# Patient Record
Sex: FEMALE | Race: WHITE | Employment: FULL TIME | URBAN - METROPOLITAN AREA
[De-identification: names, ages, dates, MRNs, and addresses within clinical notes are randomized per-mention and may not be internally consistent; named-entity substitution may affect disease eponyms.]

---

## 2019-09-26 ENCOUNTER — APPOINTMENT (OUTPATIENT)
Dept: GENERAL RADIOLOGY | Age: 19
End: 2019-09-26
Payer: COMMERCIAL

## 2019-09-26 ENCOUNTER — APPOINTMENT (OUTPATIENT)
Dept: CT IMAGING | Age: 19
End: 2019-09-26
Payer: COMMERCIAL

## 2019-09-26 ENCOUNTER — HOSPITAL ENCOUNTER (EMERGENCY)
Age: 19
Discharge: HOME | End: 2019-09-27
Attending: EMERGENCY MEDICINE
Payer: COMMERCIAL

## 2019-09-26 DIAGNOSIS — I95.1 ORTHOSTATIC HYPOTENSION: ICD-10-CM

## 2019-09-26 DIAGNOSIS — R42 DIZZINESS: Primary | ICD-10-CM

## 2019-09-26 LAB
A/G RATIO: 1.4 (ref 1.1–2.2)
ALBUMIN SERPL-MCNC: 4.2 G/DL (ref 3.4–5)
ALP BLD-CCNC: 64 U/L (ref 40–129)
ALT SERPL-CCNC: 9 U/L (ref 10–40)
AMPHETAMINE SCREEN, URINE: NORMAL
ANION GAP SERPL CALCULATED.3IONS-SCNC: 11 MMOL/L (ref 3–16)
AST SERPL-CCNC: 11 U/L (ref 15–37)
BACTERIA: ABNORMAL /HPF
BARBITURATE SCREEN URINE: NORMAL
BASOPHILS ABSOLUTE: 0 K/UL (ref 0–0.2)
BASOPHILS RELATIVE PERCENT: 0.4 %
BENZODIAZEPINE SCREEN, URINE: NORMAL
BILIRUB SERPL-MCNC: <0.2 MG/DL (ref 0–1)
BILIRUBIN URINE: NEGATIVE
BLOOD, URINE: ABNORMAL
BUN BLDV-MCNC: 12 MG/DL (ref 7–20)
CALCIUM SERPL-MCNC: 9.4 MG/DL (ref 8.3–10.6)
CANNABINOID SCREEN URINE: NORMAL
CHLORIDE BLD-SCNC: 101 MMOL/L (ref 99–110)
CLARITY: CLEAR
CO2: 23 MMOL/L (ref 21–32)
COCAINE METABOLITE SCREEN URINE: NORMAL
COLOR: YELLOW
CREAT SERPL-MCNC: 0.8 MG/DL (ref 0.6–1.1)
D DIMER: 249 NG/ML DDU (ref 0–229)
EOSINOPHILS ABSOLUTE: 0.1 K/UL (ref 0–0.6)
EOSINOPHILS RELATIVE PERCENT: 1.2 %
EPITHELIAL CELLS, UA: ABNORMAL /HPF
GFR AFRICAN AMERICAN: >60
GFR NON-AFRICAN AMERICAN: >60
GLOBULIN: 2.9 G/DL
GLUCOSE BLD-MCNC: 155 MG/DL (ref 70–99)
GLUCOSE URINE: NEGATIVE MG/DL
HCG QUALITATIVE: NEGATIVE
HCT VFR BLD CALC: 37.7 % (ref 36–48)
HEMOGLOBIN: 12.6 G/DL (ref 12–16)
KETONES, URINE: NEGATIVE MG/DL
LEUKOCYTE ESTERASE, URINE: NEGATIVE
LYMPHOCYTES ABSOLUTE: 2.5 K/UL (ref 1–5.1)
LYMPHOCYTES RELATIVE PERCENT: 34 %
Lab: NORMAL
MCH RBC QN AUTO: 27.9 PG (ref 26–34)
MCHC RBC AUTO-ENTMCNC: 33.5 G/DL (ref 31–36)
MCV RBC AUTO: 83.2 FL (ref 80–100)
METHADONE SCREEN, URINE: NORMAL
MICROSCOPIC EXAMINATION: YES
MONOCYTES ABSOLUTE: 0.4 K/UL (ref 0–1.3)
MONOCYTES RELATIVE PERCENT: 5.9 %
MUCUS: ABNORMAL /LPF
NEUTROPHILS ABSOLUTE: 4.3 K/UL (ref 1.7–7.7)
NEUTROPHILS RELATIVE PERCENT: 58.5 %
NITRITE, URINE: NEGATIVE
OPIATE SCREEN URINE: NORMAL
OXYCODONE URINE: NORMAL
PDW BLD-RTO: 15.1 % (ref 12.4–15.4)
PH UA: 6
PH UA: 6 (ref 5–8)
PHENCYCLIDINE SCREEN URINE: NORMAL
PLATELET # BLD: 321 K/UL (ref 135–450)
PMV BLD AUTO: 7.4 FL (ref 5–10.5)
POTASSIUM REFLEX MAGNESIUM: 3.6 MMOL/L (ref 3.5–5.1)
PROPOXYPHENE SCREEN: NORMAL
PROTEIN UA: NEGATIVE MG/DL
RBC # BLD: 4.53 M/UL (ref 4–5.2)
RBC UA: ABNORMAL /HPF (ref 0–2)
SODIUM BLD-SCNC: 135 MMOL/L (ref 136–145)
SPECIFIC GRAVITY UA: >=1.03 (ref 1–1.03)
TOTAL PROTEIN: 7.1 G/DL (ref 6.4–8.2)
URINE REFLEX TO CULTURE: ABNORMAL
URINE TYPE: ABNORMAL
UROBILINOGEN, URINE: 0.2 E.U./DL
WBC # BLD: 7.4 K/UL (ref 4–11)
WBC UA: ABNORMAL /HPF (ref 0–5)

## 2019-09-26 PROCEDURE — 80307 DRUG TEST PRSMV CHEM ANLYZR: CPT

## 2019-09-26 PROCEDURE — 85379 FIBRIN DEGRADATION QUANT: CPT

## 2019-09-26 PROCEDURE — 6360000004 HC RX CONTRAST MEDICATION: Performed by: EMERGENCY MEDICINE

## 2019-09-26 PROCEDURE — 71046 X-RAY EXAM CHEST 2 VIEWS: CPT

## 2019-09-26 PROCEDURE — 80053 COMPREHEN METABOLIC PANEL: CPT

## 2019-09-26 PROCEDURE — 93005 ELECTROCARDIOGRAM TRACING: CPT | Performed by: NURSE PRACTITIONER

## 2019-09-26 PROCEDURE — 84703 CHORIONIC GONADOTROPIN ASSAY: CPT

## 2019-09-26 PROCEDURE — 99284 EMERGENCY DEPT VISIT MOD MDM: CPT

## 2019-09-26 PROCEDURE — 85025 COMPLETE CBC W/AUTO DIFF WBC: CPT

## 2019-09-26 PROCEDURE — 96361 HYDRATE IV INFUSION ADD-ON: CPT

## 2019-09-26 PROCEDURE — 81001 URINALYSIS AUTO W/SCOPE: CPT

## 2019-09-26 PROCEDURE — 71260 CT THORAX DX C+: CPT

## 2019-09-26 PROCEDURE — 6370000000 HC RX 637 (ALT 250 FOR IP): Performed by: NURSE PRACTITIONER

## 2019-09-26 PROCEDURE — 2580000003 HC RX 258: Performed by: NURSE PRACTITIONER

## 2019-09-26 RX ORDER — DIPHENHYDRAMINE HYDROCHLORIDE 50 MG/ML
12.5 INJECTION INTRAMUSCULAR; INTRAVENOUS ONCE
Status: COMPLETED | OUTPATIENT
Start: 2019-09-26 | End: 2019-09-27

## 2019-09-26 RX ORDER — 0.9 % SODIUM CHLORIDE 0.9 %
1000 INTRAVENOUS SOLUTION INTRAVENOUS ONCE
Status: COMPLETED | OUTPATIENT
Start: 2019-09-26 | End: 2019-09-27

## 2019-09-26 RX ORDER — MECLIZINE HYDROCHLORIDE CHEWABLE TABLETS 25 MG/1
25 TABLET, CHEWABLE ORAL ONCE
Status: COMPLETED | OUTPATIENT
Start: 2019-09-26 | End: 2019-09-26

## 2019-09-26 RX ORDER — 0.9 % SODIUM CHLORIDE 0.9 %
2000 INTRAVENOUS SOLUTION INTRAVENOUS ONCE
Status: COMPLETED | OUTPATIENT
Start: 2019-09-26 | End: 2019-09-27

## 2019-09-26 RX ORDER — ONDANSETRON 4 MG/1
4 TABLET, FILM COATED ORAL EVERY 8 HOURS PRN
Qty: 20 TABLET | Refills: 0 | Status: SHIPPED | OUTPATIENT
Start: 2019-09-26

## 2019-09-26 RX ORDER — MECLIZINE HYDROCHLORIDE 25 MG/1
25 TABLET ORAL 3 TIMES DAILY PRN
Qty: 30 TABLET | Refills: 0 | Status: SHIPPED | OUTPATIENT
Start: 2019-09-26 | End: 2019-10-06

## 2019-09-26 RX ORDER — METOCLOPRAMIDE HYDROCHLORIDE 5 MG/ML
10 INJECTION INTRAMUSCULAR; INTRAVENOUS ONCE
Status: COMPLETED | OUTPATIENT
Start: 2019-09-26 | End: 2019-09-27

## 2019-09-26 RX ORDER — NORETHINDRONE ACETATE AND ETHINYL ESTRADIOL 1MG-20(21)
1 KIT ORAL DAILY
COMMUNITY
Start: 2019-08-07

## 2019-09-26 RX ORDER — KETOROLAC TROMETHAMINE 30 MG/ML
30 INJECTION, SOLUTION INTRAMUSCULAR; INTRAVENOUS ONCE
Status: COMPLETED | OUTPATIENT
Start: 2019-09-26 | End: 2019-09-27

## 2019-09-26 RX ADMIN — MECLIZINE HCL 25 MG: 25 TABLET, CHEWABLE ORAL at 21:00

## 2019-09-26 RX ADMIN — SODIUM CHLORIDE 2000 ML: 9 INJECTION, SOLUTION INTRAVENOUS at 20:59

## 2019-09-26 RX ADMIN — IOPAMIDOL 85 ML: 755 INJECTION, SOLUTION INTRAVENOUS at 22:07

## 2019-09-26 ASSESSMENT — PAIN DESCRIPTION - DESCRIPTORS: DESCRIPTORS: ACHING

## 2019-09-26 ASSESSMENT — PAIN DESCRIPTION - PAIN TYPE: TYPE: ACUTE PAIN

## 2019-09-26 ASSESSMENT — PAIN DESCRIPTION - LOCATION: LOCATION: HEAD

## 2019-09-26 ASSESSMENT — PAIN SCALES - GENERAL: PAINLEVEL_OUTOF10: 3

## 2019-09-27 VITALS
OXYGEN SATURATION: 99 % | WEIGHT: 200 LBS | HEIGHT: 63 IN | DIASTOLIC BLOOD PRESSURE: 79 MMHG | BODY MASS INDEX: 35.44 KG/M2 | TEMPERATURE: 97.9 F | SYSTOLIC BLOOD PRESSURE: 120 MMHG | RESPIRATION RATE: 19 BRPM | HEART RATE: 81 BPM

## 2019-09-27 LAB
EKG ATRIAL RATE: 104 BPM
EKG DIAGNOSIS: NORMAL
EKG P AXIS: 45 DEGREES
EKG P-R INTERVAL: 152 MS
EKG Q-T INTERVAL: 326 MS
EKG QRS DURATION: 86 MS
EKG QTC CALCULATION (BAZETT): 428 MS
EKG R AXIS: 16 DEGREES
EKG T AXIS: 11 DEGREES
EKG VENTRICULAR RATE: 104 BPM

## 2019-09-27 PROCEDURE — 96361 HYDRATE IV INFUSION ADD-ON: CPT

## 2019-09-27 PROCEDURE — 93010 ELECTROCARDIOGRAM REPORT: CPT | Performed by: INTERNAL MEDICINE

## 2019-09-27 PROCEDURE — 6360000002 HC RX W HCPCS: Performed by: NURSE PRACTITIONER

## 2019-09-27 PROCEDURE — 2580000003 HC RX 258: Performed by: NURSE PRACTITIONER

## 2019-09-27 PROCEDURE — 96375 TX/PRO/DX INJ NEW DRUG ADDON: CPT

## 2019-09-27 PROCEDURE — 96374 THER/PROPH/DIAG INJ IV PUSH: CPT

## 2019-09-27 RX ADMIN — KETOROLAC TROMETHAMINE 30 MG: 30 INJECTION, SOLUTION INTRAMUSCULAR at 00:18

## 2019-09-27 RX ADMIN — SODIUM CHLORIDE 1000 ML: 9 INJECTION, SOLUTION INTRAVENOUS at 00:18

## 2019-09-27 RX ADMIN — DIPHENHYDRAMINE HYDROCHLORIDE 12.5 MG: 50 INJECTION, SOLUTION INTRAMUSCULAR; INTRAVENOUS at 00:18

## 2019-09-27 RX ADMIN — METOCLOPRAMIDE 10 MG: 5 INJECTION, SOLUTION INTRAMUSCULAR; INTRAVENOUS at 00:18

## 2019-09-27 ASSESSMENT — ENCOUNTER SYMPTOMS
ABDOMINAL PAIN: 0
SHORTNESS OF BREATH: 0

## 2019-09-27 ASSESSMENT — PAIN SCALES - GENERAL: PAINLEVEL_OUTOF10: 4

## 2019-09-27 NOTE — ED PROVIDER NOTES
visit, please see the advanced practice provider's documentation. Comment: Please note this report has been produced using speech recognition software and may contain errors related to that system including errors in grammar, punctuation, and spelling, as well as words and phrases that may be inappropriate. If there are any questions or concerns please feel free to contact the dictating provider for clarification.          [facility]ina [facility] [place]  09/27/19 1110

## 2020-08-29 ENCOUNTER — HOSPITAL ENCOUNTER (EMERGENCY)
Age: 20
Discharge: HOME | End: 2020-08-30
Attending: STUDENT IN AN ORGANIZED HEALTH CARE EDUCATION/TRAINING PROGRAM
Payer: COMMERCIAL

## 2020-08-29 PROCEDURE — 96374 THER/PROPH/DIAG INJ IV PUSH: CPT

## 2020-08-29 PROCEDURE — 2500000003 HC RX 250 WO HCPCS: Performed by: PHYSICIAN ASSISTANT

## 2020-08-29 PROCEDURE — 96372 THER/PROPH/DIAG INJ SC/IM: CPT

## 2020-08-29 PROCEDURE — 99283 EMERGENCY DEPT VISIT LOW MDM: CPT

## 2020-08-29 PROCEDURE — 96375 TX/PRO/DX INJ NEW DRUG ADDON: CPT

## 2020-08-29 PROCEDURE — 6360000002 HC RX W HCPCS: Performed by: PHYSICIAN ASSISTANT

## 2020-08-29 RX ORDER — DIPHENHYDRAMINE HYDROCHLORIDE 50 MG/ML
50 INJECTION INTRAMUSCULAR; INTRAVENOUS ONCE
Status: COMPLETED | OUTPATIENT
Start: 2020-08-29 | End: 2020-08-29

## 2020-08-29 RX ORDER — METHYLPREDNISOLONE SODIUM SUCCINATE 125 MG/2ML
125 INJECTION, POWDER, LYOPHILIZED, FOR SOLUTION INTRAMUSCULAR; INTRAVENOUS ONCE
Status: COMPLETED | OUTPATIENT
Start: 2020-08-29 | End: 2020-08-29

## 2020-08-29 RX ADMIN — METHYLPREDNISOLONE SODIUM SUCCINATE 125 MG: 125 INJECTION, POWDER, FOR SOLUTION INTRAMUSCULAR; INTRAVENOUS at 20:53

## 2020-08-29 RX ADMIN — DIPHENHYDRAMINE HYDROCHLORIDE 50 MG: 50 INJECTION, SOLUTION INTRAMUSCULAR; INTRAVENOUS at 20:53

## 2020-08-29 RX ADMIN — FAMOTIDINE 40 MG: 10 INJECTION INTRAVENOUS at 20:53

## 2020-08-29 RX ADMIN — EPINEPHRINE 0.3 MG: 1 INJECTION INTRAMUSCULAR; INTRAVENOUS; SUBCUTANEOUS at 21:24

## 2020-08-30 VITALS
RESPIRATION RATE: 14 BRPM | SYSTOLIC BLOOD PRESSURE: 121 MMHG | HEART RATE: 86 BPM | DIASTOLIC BLOOD PRESSURE: 92 MMHG | TEMPERATURE: 98.4 F | OXYGEN SATURATION: 97 %

## 2020-08-30 RX ORDER — EPINEPHRINE 0.3 MG/.3ML
0.3 INJECTION SUBCUTANEOUS ONCE
Qty: 2 EACH | Refills: 0 | Status: SHIPPED | OUTPATIENT
Start: 2020-08-30 | End: 2020-08-30

## 2020-08-30 NOTE — ED PROVIDER NOTES
I independently examined and evaluated Mariah Score. In brief, patient has a history of severe bee allergy for which she has an EpiPen. At approximately 8 PM patient ate shellfish and approximate 20 minutes later began to have tingling/numbness in her lips. She denies swelling of her lips but does feel that her tongue is swollen. She does report some difficulty swallowing and sore throat. She does appear to be tolerating secretions. She reports nausea but no vomiting or diarrhea. She does report some mild shortness of breath. She denies any rash or itching. She did not have her EpiPen with her, but reports this feels like other episodes but she has required an EpiPen. Focused exam revealed tachycardia, regular rhythm. Lungs clear to auscultation bilaterally-no evidence of wheezing or stridor. Abdomen is soft and nontender. I do not appreciate any urticaria. Patient's lips are stable from baseline based off of picture that patient shows me. I do not appreciate any significant swelling of the tongue, but patient does report that she feels her tongue is swollen. No swelling of the uvula. Mallampati score of 3. ED course:     Patient in no acute distress. Patient has a history of anaphylaxis to bee stings, she was exposed to shellfish today and feels that she is having an anaphylactic reaction. Patient is reporting tongue swelling and soreness of her throat. She does report some mild difficulty breathing and nausea. I do not appreciate any hives. SHe is tolerating secretions. Findings questionably concerning for some swelling at the back of the oropharynx. She is tachycardic. Patient initially given Solu-Medrol, Pepcid, and Benadryl. After my evaluation, patient states that she feels her symptoms are still worsening after this initial medication the patient was given epinephrine. Patient was also given a liter of fluids for tachycardia.   Discussed with patient when she received the

## 2020-08-30 NOTE — ED PROVIDER NOTES
[address] ED  EMERGENCY DEPARTMENT ENCOUNTER        Pt Name: Lynnell Bosworth  MRN: [id number]  [place] 2000  Date of evaluation: 8/29/2020  Provider: Flaquita Alamo PA-C  PCP: Marifer Katz DO  ED Attending: Kavya Gonzalez MD      This patient was seen and evaluated by the attending physician Kavya Gonzalez MD.    I have not independently evaluated this patient. CHIEF COMPLAINT       Chief Complaint   Patient presents with    Allergic Reaction     patient states she tried shell fish for the first time tonight and now c/o tongue swelling and difficulty swallowing. HISTORY OF PRESENT ILLNESS   (Location/Symptom, Timing/Onset, Context/Setting, Quality, Duration, Modifying Factors, Severity)  Note limiting factors. Lynnell Bosworth is a 22 y.o. female for evaluation via private vehicle accompanied by her father at bedside they advised 30 minutes prior to arrival she ate shellfish for the first time a lobster at [employer]. And patient began to have symptoms consistent with when she has a peanut allergy. Patient is concerned that she is having an allergic reaction. She advised she feeling like it is hard to breathe and her lips are swelling and tingling. She feels like her airway is constricted. Patient advised she has an EpiPen however it is at home. Patient denies any history of similar episodes. Nursing Notes were all reviewed and agreed with or any disagreements were addressed  in the HPI. REVIEW OF SYSTEMS  (2-9 systems for level 4, 10 or more for level 5)     Review of Systems   Constitutional: Negative for chills and fever. HENT: Positive for sore throat. Negative for congestion, rhinorrhea, sinus pressure and sinus pain. Eyes: Negative for discharge, redness and visual disturbance. Respiratory: Positive for shortness of breath and wheezing. Negative for cough and chest tightness. Cardiovascular: Negative for chest pain and palpitations. Gastrointestinal: Negative for abdominal pain, constipation, diarrhea, nausea and vomiting. Genitourinary: Negative for difficulty urinating, dysuria and frequency. Musculoskeletal: Negative. Skin: Negative. Neurological: Negative. Negative for dizziness, weakness, numbness and headaches. Psychiatric/Behavioral: Negative. All other systems reviewed and are negative. Positivesand Pertinent negatives as per HPI. Except as noted above in the ROS, all other systems were reviewed and negative.        PAST MEDICAL HISTORY     Past Medical History:   Diagnosis Date    Anxiety     Tension headache          SURGICAL HISTORY       Past Surgical History:   Procedure Laterality Date    APPENDECTOMY      TONSILLECTOMY      WISDOM TOOTH EXTRACTION  12-         CURRENT MEDICATIONS       Previous Medications    BLISOVI FE 1/20 1-20 MG-MCG PER TABLET    Take 1 tablet by mouth daily    EPINEPHRINE (EPIPEN 2-EARL) 0.3 MG/0.3ML SOAJ INJECTION    Inject 0.3 mLs into the muscle once for 1 dose Use as directed for allergic reaction    ONDANSETRON (ZOFRAN) 4 MG TABLET    Take 1 tablet by mouth every 8 hours as needed for Nausea         ALLERGIES     Bee venom and Dilaudid [hydromorphone]    FAMILY HISTORY       Family History   Problem Relation Age of Onset    Diabetes Mother     High Blood Pressure Mother     Diabetes Father     High Blood Pressure Father     High Cholesterol Father          SOCIAL HISTORY       Social History     Socioeconomic History    Marital status: Single     Spouse name: None    Number of children: None    Years of education: None    Highest education level: None   Occupational History    None   Social Needs    Financial resource strain: None    Food insecurity     Worry: None     Inability: None    Transportation needs     Medical: None     Non-medical: None   Tobacco Use    Smoking status: Never Smoker    Smokeless tobacco: Never Used   Substance and Sexual Activity    Alcohol use: No    Drug use: No    Sexual activity: None   Lifestyle    Physical activity     Days per week: None     Minutes per session: None    Stress: None   Relationships    Social connections     Talks on phone: None     Gets together: None     Attends Hoahaoism service: None     Active member of club or organization: None     Attends meetings of clubs or organizations: None     Relationship status: None    Intimate partner violence     Fear of current or ex partner: None     Emotionally abused: None     Physically abused: None     Forced sexual activity: None   Other Topics Concern    None   Social History Narrative    None       SCREENINGS     NIH Score       [facility]     Heart Score         PHYSICAL EXAM    (up to 7 for level 4, 8 ormore for level 5)     ED Triage Vitals [08/29/20 2025]   BP Temp Temp Source Pulse Resp SpO2 Height Weight   (!) 127/93 98.4 °F (36.9 °C) Oral 101 18 100 % -- --       Physical Exam  Vitals signs and nursing note reviewed. Constitutional:       Appearance: She is well-developed. She is not diaphoretic. HENT:      Head: Normocephalic. Nose: Nose normal.      Mouth/Throat:      Pharynx: Oropharynx is clear. Uvula midline. No pharyngeal swelling, oropharyngeal exudate, posterior oropharyngeal erythema or uvula swelling. Tonsils: No tonsillar exudate or tonsillar abscesses. Comments: Patient reports that the back of her throat is very scratchy. And she is concerned that she is having difficulty with breathing. Patient's airway is patent no evidence of any occlusion or swelling. No evidence of lip swelling. Eyes:      General:         Right eye: No discharge. Left eye: No discharge. Conjunctiva/sclera: Conjunctivae normal.      Pupils: Pupils are equal, round, and reactive to light. Neck:      Musculoskeletal: Normal range of motion. Cardiovascular:      Rate and Rhythm: Normal rate and regular rhythm. Heart sounds: Normal heart sounds. No murmur. No friction rub. No gallop. Pulmonary:      Effort: Pulmonary effort is normal. No respiratory distress. Breath sounds: Normal breath sounds. No wheezing. Comments: No wheezes no crackles or rails. Good air movement throughout  Abdominal:      General: Bowel sounds are normal. There is no distension. Palpations: Abdomen is soft. Tenderness: There is no abdominal tenderness. Musculoskeletal: Normal range of motion. Skin:     General: Skin is warm and dry. Capillary Refill: Capillary refill takes less than 2 seconds. Neurological:      General: No focal deficit present. Mental Status: She is alert and oriented to person, place, and time. Psychiatric:         Behavior: Behavior normal.         DIAGNOSTIC RESULTS   LABS:    Labs Reviewed - No data to display    All other labs were within normal range or notreturned as of this dictation. EKG: All EKG's are interpreted by the Emergency Department Physician who either signs or Co-signs this chart in the absence of a cardiologist.  Please see their note for interpretation of EKG. EMERGENCY DEPARTMENT COURSE and DIFFERENTIAL DIAGNOSIS/MDM:   Vitals:    Vitals:    08/29/20 2025   BP: (!) 127/93   Pulse: 101   Resp: 18   Temp: 98.4 °F (36.9 °C)   TempSrc: Oral   SpO2: 100%       Patient was given the following medications:  Medications   methylPREDNISolone sodium (SOLU-MEDROL) injection 125 mg (has no administration in time range)   famotidine (PEPCID) injection 40 mg (has no administration in time range)   diphenhydrAMINE (BENADRYL) injection 50 mg (has no administration in time range)         Afebrile, stable, patient presents to the ED for evaluation. Seen in conjunction with attending ED provider who agrees with assessment and plan. Nontoxic patient in mild distress on initial presentation very patient is very nervous on arrival her heart rate was mildly elevated.   She is concerned that she is having an allergic reaction. Patient has no evidence of anaphylaxis or allergic response on my assessment no rash no airway swelling. Patient advised that she is having scratchiness to her throat and lip swelling. Patient's SPO2 on room air is 97%. She is provided with Solu-Medrol Pepcid and Benadryl. On assessment by attending provider epinephrine was provided we then observe the patient for the next 4 hours. Numerous re-evaluations patient is comfortable with discharge at this time. Total critical care time today provided was 55 minutes. This excludes seperately billable procedures and family discussion time. Provided for concern for allergic reaction anaphylaxis, requiring immediate evaluation medication administration telemetric monitoring and numerous re-evaluations requiring intervention with concern for potential decompensation. All questions are answered. Indications for return to the ED are discussed. Patient is advised if any new or worsening symptoms arise they should immediately return to the emergency room. Follow-up with primary care in 1-2 days. The patient tolerated their visit well. They were seen and evaluated by the Alyse Cameron MD who agreed with the assessment and plan. The patient and / or the family were informed of the results of any tests, a time was given to answer questions, a plan was proposed and they agreed [place]. I estimate there is LOW risk for AIRWAY COMPROMISE, ANAPHYLAXIS, CELLULITIS, EPIGLOTTITIS, or NECROTIZING FASCIITIS, thus I consider the discharge disposition reasonable. Also, there is no evidence or peritonitis, sepsis, or toxicity. Mara Hernandez and I have discussed the diagnosis and risks, and we agree with discharging home to follow-up with their primary doctor. We also discussed returning to the Emergency Department immediately if new or worsening symptoms occur.  We have discussed the symptoms which are most concerning (e.g., bloody stool, fever, changing or worsening pain, vomiting) that necessitate immediate return. FINAL Impression  1. Allergic reaction, initial encounter        Blood pressure (!) 121/92, pulse 86, temperature 98.4 °F (36.9 °C), temperature source Oral, resp. rate 14, SpO2 97 %. PATIENT REFERRED TO:  No follow-up provider specified.     DISCHARGE MEDICATIONS:  New Prescriptions    No medications on file       DISCONTINUED MEDICATIONS:  Discontinued Medications    No medications on file              (Please note that portions of this note were completed with a voice recognition program.  Efforts were made to edit the dictations but occasionally words are mis-transcribed.)    Jojo Sterling PA-C (electronically signed)        Jojo Sterling PA-C  08/31/20 [id number]

## 2020-08-30 NOTE — ED PROVIDER NOTES
Horace of Care Note:    I assumed care of this patient at 23:00 from Dr. Selina Zurita, please see her note for more detail. Briefly, this pt is a 29-year-old female who presents with tongue swelling and difficulty swallowing after a presumed allergic reaction. The patient thinks that it may have been to eating lobster. The patient was treated in the emergency department with IM epinephrine, Benadryl, Solu-Medrol, and Pepcid with substantial improvement. Plan at time of signout is to observe the patient till 115 at which point it will have been 4 hours since her epinephrine shot. The patient continues to have substantial improvement plan is to discharge her home with an EpiPen and strict ER return precautions and outpatient follow-up. If patient has worsening allergic reaction she will likely be admitted for further observation. Patient is reevaluated and reports substantial improvement in her symptoms. She has no difficulty breathing or swallowing at this time. I feel she is appropriate for discharge with a prescription for a EpiPen, close outpatient follow-up for possible referral to allergist, and avoiding shellfish or other potential allergens. The patient expresses understanding and agreement with this plan and is discharged home. The patient is aware that there is a chance that there is a rebound allergic reaction and she is asked to remain very vigilant for any worsening of symptoms and if these occur give herself her EpiPen shot and come back to the emergency department for further evaluation      FINAL IMPRESSION      1.  Allergic reaction, initial encounter             Michael Pena MD  08/30/20 9613

## 2020-08-31 ASSESSMENT — ENCOUNTER SYMPTOMS
SINUS PAIN: 0
WHEEZING: 1
CONSTIPATION: 0
NAUSEA: 0
EYE REDNESS: 0
SORE THROAT: 1
DIARRHEA: 0
CHEST TIGHTNESS: 0
SHORTNESS OF BREATH: 1
SINUS PRESSURE: 0
EYE DISCHARGE: 0
VOMITING: 0
ABDOMINAL PAIN: 0
RHINORRHEA: 0
COUGH: 0

## 2022-10-12 ENCOUNTER — APPOINTMENT (OUTPATIENT)
Dept: GENERAL RADIOLOGY | Age: 22
End: 2022-10-12

## 2022-10-12 ENCOUNTER — HOSPITAL ENCOUNTER (EMERGENCY)
Age: 22
Discharge: HOME | End: 2022-10-12

## 2022-10-12 VITALS
WEIGHT: 210 LBS | BODY MASS INDEX: 37.21 KG/M2 | HEIGHT: 63 IN | DIASTOLIC BLOOD PRESSURE: 76 MMHG | RESPIRATION RATE: 30 BRPM | OXYGEN SATURATION: 98 % | TEMPERATURE: 99.9 F | HEART RATE: 117 BPM | SYSTOLIC BLOOD PRESSURE: 129 MMHG

## 2022-10-12 DIAGNOSIS — R50.9 FEVER, UNSPECIFIED FEVER CAUSE: ICD-10-CM

## 2022-10-12 DIAGNOSIS — U07.1 COVID: Primary | ICD-10-CM

## 2022-10-12 LAB
ANION GAP SERPL CALCULATED.3IONS-SCNC: 15 MMOL/L (ref 4–16)
BASOPHILS ABSOLUTE: 0 K/CU MM
BASOPHILS RELATIVE PERCENT: 0.4 % (ref 0–1)
BILIRUBIN URINE: NEGATIVE
BLOOD, URINE: NEGATIVE
BUN BLDV-MCNC: 7 MG/DL (ref 6–23)
CALCIUM SERPL-MCNC: 9.4 MG/DL (ref 8.3–10.6)
CHLORIDE BLD-SCNC: 99 MMOL/L (ref 99–110)
CLARITY: CLEAR
CO2: 23 MMOL/L (ref 21–32)
COLOR: YELLOW
CREAT SERPL-MCNC: 0.7 MG/DL (ref 0.6–1.1)
DIFFERENTIAL TYPE: ABNORMAL
EOSINOPHILS ABSOLUTE: 0.1 K/CU MM
EOSINOPHILS RELATIVE PERCENT: 1.2 % (ref 0–3)
GFR AFRICAN AMERICAN: >60 ML/MIN/1.73M2
GFR NON-AFRICAN AMERICAN: >60 ML/MIN/1.73M2
GLUCOSE BLD-MCNC: 88 MG/DL (ref 70–99)
GLUCOSE, URINE: NEGATIVE MG/DL
HCT VFR BLD CALC: 38.7 % (ref 37–47)
HEMOGLOBIN: 12.5 GM/DL (ref 12.5–16)
IMMATURE NEUTROPHIL %: 0.4 % (ref 0–0.43)
KETONES, URINE: NEGATIVE MG/DL
LEUKOCYTE ESTERASE, URINE: NEGATIVE
LYMPHOCYTES ABSOLUTE: 0.8 K/CU MM
LYMPHOCYTES RELATIVE PERCENT: 10.3 % (ref 24–44)
MCH RBC QN AUTO: 26.8 PG (ref 27–31)
MCHC RBC AUTO-ENTMCNC: 32.3 % (ref 32–36)
MCV RBC AUTO: 83 FL (ref 78–100)
MONOCYTES ABSOLUTE: 0.6 K/CU MM
MONOCYTES RELATIVE PERCENT: 8 % (ref 0–4)
NITRITE URINE, QUANTITATIVE: NEGATIVE
NUCLEATED RBC %: 0 %
PDW BLD-RTO: 14.1 % (ref 11.7–14.9)
PH, URINE: 6.5
PLATELET # BLD: 252 K/CU MM (ref 140–440)
PMV BLD AUTO: 9.9 FL (ref 7.5–11.1)
POTASSIUM SERPL-SCNC: 4.1 MMOL/L (ref 3.5–5.1)
PROTEIN UA: NEGATIVE MG/DL
RBC # BLD: 4.66 M/CU MM (ref 4.2–5.4)
SARS-COV-2, NAAT: DETECTED
SEGMENTED NEUTROPHILS ABSOLUTE COUNT: 6.2 K/CU MM
SEGMENTED NEUTROPHILS RELATIVE PERCENT: 79.7 % (ref 36–66)
SODIUM BLD-SCNC: 137 MMOL/L (ref 135–145)
SOURCE: ABNORMAL
SPECIFIC GRAVITY UA: 1.02
TOTAL IMMATURE NEUTOROPHIL: 0.03 K/CU MM
TOTAL NUCLEATED RBC: 0 K/CU MM
UROBILINOGEN, URINE: NORMAL MG/DL
WBC # BLD: 7.7 K/CU MM (ref 4–10.5)

## 2022-10-12 PROCEDURE — 96361 HYDRATE IV INFUSION ADD-ON: CPT

## 2022-10-12 PROCEDURE — 71045 X-RAY EXAM CHEST 1 VIEW: CPT

## 2022-10-12 PROCEDURE — 85025 COMPLETE CBC W/AUTO DIFF WBC: CPT

## 2022-10-12 PROCEDURE — 80048 BASIC METABOLIC PNL TOTAL CA: CPT

## 2022-10-12 PROCEDURE — 6360000002 HC RX W HCPCS: Performed by: PHYSICIAN ASSISTANT

## 2022-10-12 PROCEDURE — 93005 ELECTROCARDIOGRAM TRACING: CPT | Performed by: PHYSICIAN ASSISTANT

## 2022-10-12 PROCEDURE — 6370000000 HC RX 637 (ALT 250 FOR IP): Performed by: PHYSICIAN ASSISTANT

## 2022-10-12 PROCEDURE — 87635 SARS-COV-2 COVID-19 AMP PRB: CPT

## 2022-10-12 PROCEDURE — 80307 DRUG TEST PRSMV CHEM ANLYZR: CPT

## 2022-10-12 PROCEDURE — 99285 EMERGENCY DEPT VISIT HI MDM: CPT | Performed by: PHYSICIAN ASSISTANT

## 2022-10-12 PROCEDURE — 2580000003 HC RX 258: Performed by: PHYSICIAN ASSISTANT

## 2022-10-12 PROCEDURE — 96374 THER/PROPH/DIAG INJ IV PUSH: CPT

## 2022-10-12 PROCEDURE — 81003 URINALYSIS AUTO W/O SCOPE: CPT

## 2022-10-12 RX ORDER — 0.9 % SODIUM CHLORIDE 0.9 %
1000 INTRAVENOUS SOLUTION INTRAVENOUS ONCE
Status: COMPLETED | OUTPATIENT
Start: 2022-10-12 | End: 2022-10-12

## 2022-10-12 RX ORDER — KETOROLAC TROMETHAMINE 30 MG/ML
30 INJECTION, SOLUTION INTRAMUSCULAR; INTRAVENOUS ONCE
Status: COMPLETED | OUTPATIENT
Start: 2022-10-12 | End: 2022-10-12

## 2022-10-12 RX ORDER — CODEINE PHOSPHATE AND GUAIFENESIN 10; 100 MG/5ML; MG/5ML
5 SOLUTION ORAL ONCE
Status: COMPLETED | OUTPATIENT
Start: 2022-10-12 | End: 2022-10-12

## 2022-10-12 RX ORDER — TOPIRAMATE 25 MG/1
25 TABLET ORAL 2 TIMES DAILY
COMMUNITY

## 2022-10-12 RX ORDER — ACETAMINOPHEN 500 MG
500 TABLET ORAL ONCE
Status: COMPLETED | OUTPATIENT
Start: 2022-10-12 | End: 2022-10-12

## 2022-10-12 RX ORDER — BENZONATATE 100 MG/1
100 CAPSULE ORAL ONCE
Status: COMPLETED | OUTPATIENT
Start: 2022-10-12 | End: 2022-10-12

## 2022-10-12 RX ADMIN — SODIUM CHLORIDE 1000 ML: 9 INJECTION, SOLUTION INTRAVENOUS at 19:20

## 2022-10-12 RX ADMIN — BENZONATATE 100 MG: 100 CAPSULE ORAL at 22:43

## 2022-10-12 RX ADMIN — GUAIFENESIN AND CODEINE PHOSPHATE 5 ML: 100; 10 SOLUTION ORAL at 19:41

## 2022-10-12 RX ADMIN — KETOROLAC TROMETHAMINE 30 MG: 30 INJECTION, SOLUTION INTRAMUSCULAR at 19:52

## 2022-10-12 RX ADMIN — ACETAMINOPHEN 500 MG: 500 TABLET ORAL at 22:43

## 2022-10-12 ASSESSMENT — PAIN DESCRIPTION - LOCATION
LOCATION: GENERALIZED
LOCATION: HEAD
LOCATION: GENERALIZED

## 2022-10-12 ASSESSMENT — PAIN DESCRIPTION - DESCRIPTORS
DESCRIPTORS: ACHING
DESCRIPTORS: THROBBING

## 2022-10-12 ASSESSMENT — PAIN SCALES - GENERAL
PAINLEVEL_OUTOF10: 9
PAINLEVEL_OUTOF10: 6
PAINLEVEL_OUTOF10: 5

## 2022-10-12 NOTE — ED PROVIDER NOTES
Triage Chief Complaint:   Tachycardia (Around 0330), Shortness of Breath, Chest Pain, and Blurred Vision    PARESH Ho is a 19 y.o. female that presents to the emergency department. For evaluation. Context is patient states she has been having runny nose, chest congestion scratchy throat over the last 1 week. She attributed to her seasonal allergies which is a regular thing. However she woke up today. Feeling short of breath. Very wheezy tight in the chest.  Generalized body aches and has subjective fevers. She went to work and felt even worse. And ended up leaving work early and coming here for evaluation. She has accelerated against COVID x2. She endorses dry nonproductive cough. No flank pain or urinary symptoms no dyspareunia. Currently not sexually active no urinary symptoms or flank pain. No back pain or abdominal pain neck pain or stiffness. This headache is very mild. She denies any confusion dizziness. She does state that she feels like at one point her vision seemed to be a bit blurred. Not at this time however. ROS:  At least 10 systems reviewed and otherwise negative except as in the [address]     Past Medical History:   Diagnosis Date    Anxiety     Tension headache      Past Surgical History:   Procedure Laterality Date    APPENDECTOMY      TONSILLECTOMY      WISDOM TOOTH EXTRACTION  12-     Family History   Problem Relation Age of Onset    Diabetes Mother     High Blood Pressure Mother     Diabetes Father     High Blood Pressure Father     High Cholesterol Father      Social History     Socioeconomic History    Marital status: Single     Spouse name: Not on file    Number of children: Not on file    Years of education: Not on file    Highest education level: Not on file   Occupational History    Not on file   Tobacco Use    Smoking status: Never    Smokeless tobacco: Never   Substance and Sexual Activity    Alcohol use: No    Drug use: No    Sexual activity: Not on file   Other Topics Concern    Not on file   Social History Narrative    Not on file     Social Determinants of Health     Financial Resource Strain: Not on file   Food Insecurity: Not on file   Transportation Needs: Not on file   Physical Activity: Not on file   Stress: Not on file   Social Connections: Not on file   Intimate Partner Violence: Not on file   Housing Stability: Not on file     No current facility-administered medications for this encounter. Current Outpatient Medications   Medication Sig Dispense Refill    topiramate (TOPAMAX) 25 MG tablet Take 25 mg by mouth 2 times daily      EPINEPHrine (EPIPEN 2-EARL) 0.3 MG/0.3ML SOAJ injection Inject 0.3 mLs into the muscle once for 1 dose Use as directed for allergic reaction 2 each 0    BLISOVI FE 1/20 1-20 MG-MCG per tablet Take 1 tablet by mouth daily (Patient not taking: Reported on 10/12/2022)      ondansetron (ZOFRAN) 4 MG tablet Take 1 tablet by mouth every 8 hours as needed for Nausea (Patient not taking: Reported on 10/12/2022) 20 tablet 0    EPINEPHrine (EPIPEN 2-EARL) 0.3 MG/0.3ML SOAJ injection Inject 0.3 mLs into the muscle once for 1 dose Use as directed for allergic reaction 0.3 mL 0     Allergies   Allergen Reactions    Shellfish-Derived Products Anaphylaxis    Bee Venom     Dilaudid [Hydromorphone]        Nursing Notes Reviewed    Physical Exam:  ED Triage Vitals [10/12/22 1841]   Enc Vitals Group      BP (!) 120/90      Heart Rate (!) 143      Resp 20      Temp 99.9 °F (37.7 °C)      Temp Source Oral      SpO2 96 %      Weight 210 lb (95.3 kg)      Height 5' 3\" (1.6 m)      Head Circumference       Peak Flow       Pain Score       Pain Loc       Pain Edu? Excl. in [address]? GENERAL APPEARANCE: Awake and alert. Cooperative. No acute distress. HEAD: Normocephalic. Atraumatic. EYES: EOM's grossly intact. Sclera anicteric. PERRLA. Conjunctiva non injected. No discharge  ENT: Mucous membranes are moist. No trismus.  Posterior Pharynx non injected, no tongue swelling, airway patent, no tonsillar edema. No exudates noted. No abscess. No discharge. Middle ear spaces clear. Tympanic membrane non injected. Auditory canal clear. NECK: Supple. No meningismus. No palpable masses. No lymphadenopathy. CARDIOVASCULAR: RRR. No rubs, murmurs, gallops, clicks. Radial pulses 2+. Pedal Pulses 2+. Capillary refill <2 seconds. LUNGS: Respirations unlabored. CTAB. ABDOMEN: Soft. Non-tender. No guarding or rebound. No organomegaly. No palpable masses  MUSCULOSKELETAL: No acute deformities. SKIN: Warm and dry. No rash, No erythema, No edema. No ecchymoses. NEUROLOGICAL: No gross facial drooping. Moves all 4 extremities spontaneously. PSYCHIATRIC: Normal mood.     I have reviewed and interpreted all of the currently available lab results from this visit (if applicable):  Results for orders placed or performed during the hospital encounter of 10/12/22   COVID-19, Rapid    Specimen: Nasopharyngeal   Result Value Ref Range    Source UNKNOWN     SARS-CoV-2, NAAT DETECTED (A) NOT DETECTED   CBC with Auto Differential   Result Value Ref Range    WBC 7.7 4.0 - 10.5 K/CU MM    RBC 4.66 4.2 - 5.4 M/CU MM    Hemoglobin 12.5 12.5 - 16.0 GM/DL    Hematocrit 38.7 37 - 47 %    MCV 83.0 78 - 100 FL    MCH 26.8 (L) 27 - 31 PG    MCHC 32.3 32.0 - 36.0 %    RDW 14.1 11.7 - 14.9 %    Platelets 827 366 - 882 K/CU MM    MPV 9.9 7.5 - 11.1 FL    Differential Type AUTOMATED DIFFERENTIAL     Segs Relative 79.7 (H) 36 - 66 %    Lymphocytes % 10.3 (L) 24 - 44 %    Monocytes % 8.0 (H) 0 - 4 %    Eosinophils % 1.2 0 - 3 %    Basophils % 0.4 0 - 1 %    Segs Absolute 6.2 K/CU MM    Lymphocytes Absolute 0.8 K/CU MM    Monocytes Absolute 0.6 K/CU MM    Eosinophils Absolute 0.1 K/CU MM    Basophils Absolute 0.0 K/CU MM    Nucleated RBC % 0.0 %    Total Nucleated RBC 0.0 K/CU MM    Total Immature Neutrophil 0.03 K/CU MM    Immature Neutrophil % 0.4 0 - 0.43 %   Basic Metabolic Panel Result Value Ref Range    Sodium 137 135 - 145 MMOL/L    Potassium 4.1 3.5 - 5.1 MMOL/L    Chloride 99 99 - 110 mMol/L    CO2 23 21 - 32 MMOL/L    Anion Gap 15 4 - 16    BUN 7 6 - 23 MG/DL    Creatinine 0.7 0.6 - 1.1 MG/DL    Glucose 88 70 - 99 MG/DL    Calcium 9.4 8.3 - 10.6 MG/DL    GFR Non-African American >60 >60 mL/min/1.73m2    GFR African American >60 >60 mL/min/1.73m2   Urinalysis   Result Value Ref Range    Color, UA YELLOW     Clarity, UA CLEAR     Glucose, Urine NEGATIVE MG/DL    Bilirubin Urine NEGATIVE     Ketones, Urine NEGATIVE MG/DL    Specific Gravity, UA 1.020     Blood, Urine NEGATIVE     pH, Urine 6.5     Protein, UA NEGATIVE MG/DL    Urobilinogen, Urine NORMAL MG/DL    Nitrite Urine, Quantitative NEGATIVE     Leukocyte Esterase, Urine NEGATIVE       Radiographs (if obtained):  [] The following radiograph was interpreted by myself in the absence of a radiologist:   [] Radiologist's Report Reviewed:  XR CHEST PORTABLE    (Results Pending)       EKG (if obtained):   Please See Note of attending physician for EKG interpretation. Chart review shows recent radiograph(s):  No results found. MDM:   History Signs and symptoms congruent with URI/covid. Doubt meningitis, pneumonia, bronchitis, respiratory distress, asthma exacerbation, retropharengial abscess, ludwigs angina, chronic sinusitis, otitis, streptococcal pharyngitis, conjunctivitis, pulmonary embolism, pneumothorax, other. Xray is negative for any acute cardiopulmonary processes. Chest x-ray clean. Lungs are clear. Patient does have tachycardia here. However this is associated with fever. Patient is educated on antipyretics Tylenol Motrin return precautions have low suspicion of septicemia, pulmonary embolism. Pt is to be discharged home. Pt is told to return immediately to the emergency department if he has any new, worrisome or worsening symptoms. Pt is to follow up with PCP within 2 days.   Patient vocalizes agreement and understanding with this plan and he has no questions upon disposition. Pt is comfortable upon disposition home. Patient is stable, Patients vital signs are stable. Vital signs and nursing notes reviewed during ED course. I independently saw patient today in the ED   All pertinent Lab data and radiographic results reviewed with patient at bedside. The patient and/or the family were informed of the results of any tests/labs/imaging, the treatment plan, and time was allotted to answer questions. See chart for details of medications given during the ED stay. Clinical Impression:  1. COVID    2. Fever, unspecified fever cause      (Please note that portions of this note may have been completed with a voice recognition program. Efforts were made to edit the dictations but occasionally words are mis-transcribed.)    Ronald Wynn PA-C   Comment: Please note this report has been produced using speech recognition software and may contain errors related to that system including errors in grammar, punctuation, and spelling, as well as words and phrases that may be inappropriate. If there are any questions or concerns please feel free to contact the dictating provider for clarification.         Ronald Wynn PA-C  10/12/22 [id number]

## 2022-10-12 NOTE — LETTER
[facility] Emergency Department  [address] [place] [address]  Phone: [phone]  Fax: [phone]             October 12, 2022    Patient: June Clay   YOB: 2000   Date of Visit: 10/12/2022       To Whom It May Concern:    Michael Sandra was seen and treated in our emergency department on 10/12/2022.      Sincerely,             Signature:__________________________________

## 2022-10-12 NOTE — ED NOTES
Received report from Malgorzata Romero 69, [address] [facility]. Assumed care @ this time.       Kavya Cisneros RN  10/12/22 5915

## 2022-10-13 LAB
AMPHETAMINES: NEGATIVE
BARBITURATE SCREEN URINE: NEGATIVE
BENZODIAZEPINE SCREEN, URINE: NEGATIVE
CANNABINOID SCREEN URINE: NEGATIVE
COCAINE METABOLITE: NEGATIVE
EKG ATRIAL RATE: 146 BPM
EKG DIAGNOSIS: NORMAL
EKG P AXIS: 54 DEGREES
EKG P-R INTERVAL: 126 MS
EKG Q-T INTERVAL: 276 MS
EKG QRS DURATION: 80 MS
EKG QTC CALCULATION (BAZETT): 430 MS
EKG R AXIS: 20 DEGREES
EKG T AXIS: 19 DEGREES
EKG VENTRICULAR RATE: 146 BPM
OPIATES, URINE: ABNORMAL
OXYCODONE: NEGATIVE
PHENCYCLIDINE, URINE: NEGATIVE

## 2022-10-13 PROCEDURE — 93010 ELECTROCARDIOGRAM REPORT: CPT | Performed by: INTERNAL MEDICINE

## 2022-10-13 NOTE — ED PROVIDER NOTES
EKG sinus tachycardia, nonspecific T wave abnormality, rate of 146, IN interval 126, QRS duration 80, QT/QTc 276/430     Dustin Forrest, DO  10/12/22 2035

## 2023-02-17 ENCOUNTER — HOSPITAL ENCOUNTER (EMERGENCY)
Age: 23
Discharge: HOME | End: 2023-02-17

## 2023-02-17 VITALS
WEIGHT: 230 LBS | DIASTOLIC BLOOD PRESSURE: 91 MMHG | HEART RATE: 99 BPM | SYSTOLIC BLOOD PRESSURE: 129 MMHG | OXYGEN SATURATION: 98 % | RESPIRATION RATE: 16 BRPM | TEMPERATURE: 98.3 F | BODY MASS INDEX: 40.74 KG/M2

## 2023-02-17 DIAGNOSIS — H65.01 RIGHT ACUTE SEROUS OTITIS MEDIA, RECURRENCE NOT SPECIFIED: Primary | ICD-10-CM

## 2023-02-17 LAB
ANION GAP SERPL CALCULATED.3IONS-SCNC: 11 MMOL/L (ref 4–16)
BASOPHILS ABSOLUTE: 0 K/CU MM
BASOPHILS RELATIVE PERCENT: 0.2 % (ref 0–1)
BUN SERPL-MCNC: 9 MG/DL (ref 6–23)
CALCIUM SERPL-MCNC: 9.6 MG/DL (ref 8.3–10.6)
CHLORIDE BLD-SCNC: 102 MMOL/L (ref 99–110)
CO2: 25 MMOL/L (ref 21–32)
CREAT SERPL-MCNC: 0.7 MG/DL (ref 0.6–1.1)
DIFFERENTIAL TYPE: ABNORMAL
EOSINOPHILS ABSOLUTE: 0.3 K/CU MM
EOSINOPHILS RELATIVE PERCENT: 2.3 % (ref 0–3)
GFR SERPL CREATININE-BSD FRML MDRD: >60 ML/MIN/1.73M2
GLUCOSE SERPL-MCNC: 112 MG/DL (ref 70–99)
HCT VFR BLD CALC: 39.3 % (ref 37–47)
HEMOGLOBIN: 12.8 GM/DL (ref 12.5–16)
IMMATURE NEUTROPHIL %: 0.2 % (ref 0–0.43)
LYMPHOCYTES ABSOLUTE: 2.8 K/CU MM
LYMPHOCYTES RELATIVE PERCENT: 23 % (ref 24–44)
MCH RBC QN AUTO: 26.6 PG (ref 27–31)
MCHC RBC AUTO-ENTMCNC: 32.6 % (ref 32–36)
MCV RBC AUTO: 81.5 FL (ref 78–100)
MONOCYTES ABSOLUTE: 0.6 K/CU MM
MONOCYTES RELATIVE PERCENT: 5.2 % (ref 0–4)
NUCLEATED RBC %: 0 %
PDW BLD-RTO: 14.3 % (ref 11.7–14.9)
PLATELET # BLD: 396 K/CU MM (ref 140–440)
PMV BLD AUTO: 9.4 FL (ref 7.5–11.1)
POTASSIUM SERPL-SCNC: 4.3 MMOL/L (ref 3.5–5.1)
RBC # BLD: 4.82 M/CU MM (ref 4.2–5.4)
SEGMENTED NEUTROPHILS ABSOLUTE COUNT: 8.4 K/CU MM
SEGMENTED NEUTROPHILS RELATIVE PERCENT: 69.1 % (ref 36–66)
SODIUM BLD-SCNC: 138 MMOL/L (ref 135–145)
TOTAL IMMATURE NEUTOROPHIL: 0.03 K/CU MM
TOTAL NUCLEATED RBC: 0 K/CU MM
WBC # BLD: 12.1 K/CU MM (ref 4–10.5)

## 2023-02-17 PROCEDURE — 85025 COMPLETE CBC W/AUTO DIFF WBC: CPT

## 2023-02-17 PROCEDURE — 80048 BASIC METABOLIC PNL TOTAL CA: CPT

## 2023-02-17 PROCEDURE — 99284 EMERGENCY DEPT VISIT MOD MDM: CPT

## 2023-02-17 PROCEDURE — 6370000000 HC RX 637 (ALT 250 FOR IP): Performed by: PHYSICIAN ASSISTANT

## 2023-02-17 RX ORDER — TETRACAINE HYDROCHLORIDE 5 MG/ML
2 SOLUTION OPHTHALMIC ONCE
Status: COMPLETED | OUTPATIENT
Start: 2023-02-17 | End: 2023-02-17

## 2023-02-17 RX ORDER — TRAMADOL HYDROCHLORIDE 50 MG/1
50 TABLET ORAL EVERY 6 HOURS PRN
Qty: 12 TABLET | Refills: 0 | Status: SHIPPED | OUTPATIENT
Start: 2023-02-17 | End: 2023-02-20

## 2023-02-17 RX ORDER — IBUPROFEN 600 MG/1
600 TABLET ORAL EVERY 6 HOURS PRN
Qty: 20 TABLET | Refills: 0 | Status: SHIPPED | OUTPATIENT
Start: 2023-02-17 | End: 2023-03-19

## 2023-02-17 RX ORDER — CIPROFLOXACIN HYDROCHLORIDE 3.5 MG/ML
1 SOLUTION/ DROPS TOPICAL ONCE
Status: COMPLETED | OUTPATIENT
Start: 2023-02-17 | End: 2023-02-17

## 2023-02-17 RX ORDER — TRAMADOL HYDROCHLORIDE 50 MG/1
50 TABLET ORAL ONCE
Status: COMPLETED | OUTPATIENT
Start: 2023-02-17 | End: 2023-02-17

## 2023-02-17 RX ORDER — IBUPROFEN 600 MG/1
600 TABLET ORAL ONCE
Status: COMPLETED | OUTPATIENT
Start: 2023-02-17 | End: 2023-02-17

## 2023-02-17 RX ADMIN — TRAMADOL HYDROCHLORIDE 50 MG: 50 TABLET, COATED ORAL at 22:27

## 2023-02-17 RX ADMIN — CIPROFLOXACIN HYDROCHLORIDE 1 DROP: 3 SOLUTION/ DROPS OPHTHALMIC at 23:46

## 2023-02-17 RX ADMIN — IBUPROFEN 600 MG: 600 TABLET, FILM COATED ORAL at 22:27

## 2023-02-17 RX ADMIN — TETRACAINE HYDROCHLORIDE 2 DROP: 5 SOLUTION OPHTHALMIC at 22:28

## 2023-02-17 ASSESSMENT — PAIN DESCRIPTION - DESCRIPTORS: DESCRIPTORS: STABBING

## 2023-02-17 ASSESSMENT — PAIN - FUNCTIONAL ASSESSMENT: PAIN_FUNCTIONAL_ASSESSMENT: 0-10

## 2023-02-17 ASSESSMENT — PAIN SCALES - GENERAL
PAINLEVEL_OUTOF10: 9
PAINLEVEL_OUTOF10: 9

## 2023-02-17 ASSESSMENT — PAIN DESCRIPTION - ORIENTATION: ORIENTATION: RIGHT

## 2023-02-17 ASSESSMENT — PAIN DESCRIPTION - PAIN TYPE: TYPE: ACUTE PAIN

## 2023-02-17 ASSESSMENT — PAIN DESCRIPTION - FREQUENCY: FREQUENCY: CONTINUOUS

## 2023-02-17 ASSESSMENT — PAIN DESCRIPTION - LOCATION
LOCATION: EAR
LOCATION: EAR

## 2023-02-18 LAB
EKG ATRIAL RATE: 89 BPM
EKG DIAGNOSIS: NORMAL
EKG P AXIS: 49 DEGREES
EKG P-R INTERVAL: 148 MS
EKG Q-T INTERVAL: 354 MS
EKG QRS DURATION: 88 MS
EKG QTC CALCULATION (BAZETT): 430 MS
EKG R AXIS: 29 DEGREES
EKG T AXIS: 24 DEGREES
EKG VENTRICULAR RATE: 89 BPM

## 2023-02-18 NOTE — ED TRIAGE NOTES
Patient presents to the ED with complaint of right ear pain. Patient states that yesterday she was experiencing ear pressure and that today it worsened and became severe pain. Patient also states that she is unable to hear anything out of her right ear.

## 2023-02-18 NOTE — ED PROVIDER NOTES
Triage Chief Complaint:   Otalgia (Right ear pain and drainage. Pt reports she thinks she might have a \"blown ear drum\"./Has taken ibuprofen and tylenol with no relief of pain 9/10. )    [facility] [place]:  Today in the ED I had the pleasure of caring for Eliot Bee who is a 23 y.o. female that presents today 3 with complaint of right-sided ear pain 9/10. Ongoing x2 days. Patient was diagnosed with strep throat about 4 to 5 days ago has been on penicillin. She had worsening ear pain and associated drainage x1 day. No fevers or chills nausea vomit diarrhea no chest pain no shortness of breath.     ROS:  REVIEW OF SYSTEMS    At least 10 systems reviewed      All other review of systems are negative  See HPI and nursing notes for additional information       Past Medical History:   Diagnosis Date    Anxiety     Tension headache      Past Surgical History:   Procedure Laterality Date    APPENDECTOMY      TONSILLECTOMY      WISDOM TOOTH EXTRACTION  10-     Family History   Problem Relation Age of Onset    Diabetes Mother     High Blood Pressure Mother     Diabetes Father     High Blood Pressure Father     High Cholesterol Father      Social History     Socioeconomic History    Marital status: Single     Spouse name: Not on file    Number of children: Not on file    Years of education: Not on file    Highest education level: Not on file   Occupational History    Not on file   Tobacco Use    Smoking status: Never    Smokeless tobacco: Never   Substance and Sexual Activity    Alcohol use: No    Drug use: No    Sexual activity: Not on file   Other Topics Concern    Not on file   Social History Narrative    Not on file     Social Determinants of Health     Financial Resource Strain: Not on file   Food Insecurity: Not on file   Transportation Needs: Not on file   Physical Activity: Not on file   Stress: Not on file   Social Connections: Not on file   Intimate Partner Violence: Not on file   Housing Stability: Not on file     Current Facility-Administered Medications   Medication Dose Route Frequency Provider Last Rate Last Admin    ciprofloxacin (CILOXAN) 0.3 % ophthalmic solution 1 drop  1 drop Right Ear Once Augustine Lewis PA-C         Current Outpatient Medications   Medication Sig Dispense Refill    traMADol (ULTRAM) 50 MG tablet Take 1 tablet by mouth every 6 hours as needed for Pain for up to 3 days. Intended supply: 3 days. Take lowest dose possible to manage pain Max Daily Amount: 200 mg 12 tablet 0    ibuprofen (IBU) 600 MG tablet Take 1 tablet by mouth every 6 hours as needed for Pain 20 tablet 0    topiramate (TOPAMAX) 25 MG tablet Take 25 mg by mouth 2 times daily      EPINEPHrine (EPIPEN 2-EARL) 0.3 MG/0.3ML SOAJ injection Inject 0.3 mLs into the muscle once for 1 dose Use as directed for allergic reaction 2 each 0    BLISOVI FE 1/20 1-20 MG-MCG per tablet Take 1 tablet by mouth daily (Patient not taking: Reported on 10/12/2022)      ondansetron (ZOFRAN) 4 MG tablet Take 1 tablet by mouth every 8 hours as needed for Nausea (Patient not taking: Reported on 10/12/2022) 20 tablet 0    EPINEPHrine (EPIPEN 2-EARL) 0.3 MG/0.3ML SOAJ injection Inject 0.3 mLs into the muscle once for 1 dose Use as directed for allergic reaction 0.3 mL 0     Allergies   Allergen Reactions    Shellfish-Derived Products Anaphylaxis     Anaphylaxis      Bee Venom     Dilaudid [Hydromorphone]        Nursing Notes Reviewed    Physical Exam:  ED Triage Vitals [02/17/23 2032]   Enc Vitals Group      /88      Heart Rate 96      Resp 16      Temp 98.5 °F (36.9 °C)      Temp Source Oral      SpO2 99 %      Weight 230 lb (104.3 kg)      Height       Head Circumference       Peak Flow       Pain Score       Pain Loc       Pain Edu?       Excl. in GC?      General :Patient is awake alert oriented person place and time no acute distress nontoxic appearing  HEENT: Pupils are equally round and reactive to light extraocular motors are intact  conjunctivae clear sclerae white there is no injection no icterus. Nose without any rhinorrhea or epistaxis. Oral mucosa is moist no exudate buccal mucosa shows no ulcerations. Uvula is midline . R tm injected with midddle ear space serous effusion. No mastoid ttp or erythema. Neck: Neck is supple full range of motion trachea midline thyroid nonpalpable  Cardiac: Heart regular rate rhythm no murmurs rubs clicks or gallops  Lungs: Lungs are clear to auscultation there is no wheezing rhonchi or rales. There is no use of accessory muscles no nasal flaring identified. Chest wall: There is no tenderness to palpation over the chest wall or over ribs  Abdomen: Abdomen is soft nontender nondistended. There is no firm or pulsatile masses no rebound rigidity or guarding negative Baptiste's negative McBurney, no peritoneal signs  Suprapubic:  there is no tenderness to palpation over the external bladder   Musculoskeletal: 5 out of 5 strength in all 4 extremities full flexion extension abduction and adduction supination pronation of all extremities and all digits. No obvious muscle atrophy is noted. No focal muscle deficits are appreciated  Dermatology: Skin is warm and dry there is no obvious abscesses lacerations or lesions noted  Psych: Mentation is grossly normal cognition is grossly normal. Affect is appropriate  Neuro: Motor intact sensory intact cranial nerves II through XII are intact level of consciousness is normal cerebellar function is normal reflexes are grossly normal. No evidence of incontinence or loss of bowel or bladder no saddle anesthesia noted Lymphatic: There is no submandibular or cervical adenopathy appreciated.         I have reviewed and interpreted all of the currently available lab results from this visit (if applicable):  Results for orders placed or performed during the hospital encounter of 02/17/23   CBC with Auto Differential   Result Value Ref Range    WBC 12.1 (H) 4.0 - 10.5 K/CU MM    RBC 4. 82 4.2 - 5.4 M/CU MM    Hemoglobin 12.8 12.5 - 16.0 GM/DL    Hematocrit 39.3 37 - 47 %    MCV 81.5 78 - 100 FL    MCH 26.6 (L) 27 - 31 PG    MCHC 32.6 32.0 - 36.0 %    RDW 14.3 11.7 - 14.9 %    Platelets 277 410 - 816 K/CU MM    MPV 9.4 7.5 - 11.1 FL    Differential Type AUTOMATED DIFFERENTIAL     Segs Relative 69.1 (H) 36 - 66 %    Lymphocytes % 23.0 (L) 24 - 44 %    Monocytes % 5.2 (H) 0 - 4 %    Eosinophils % 2.3 0 - 3 %    Basophils % 0.2 0 - 1 %    Segs Absolute 8.4 K/CU MM    Lymphocytes Absolute 2.8 K/CU MM    Monocytes Absolute 0.6 K/CU MM    Eosinophils Absolute 0.3 K/CU MM    Basophils Absolute 0.0 K/CU MM    Nucleated RBC % 0.0 %    Total Nucleated RBC 0.0 K/CU MM    Total Immature Neutrophil 0.03 K/CU MM    Immature Neutrophil % 0.2 0 - 0.43 %   Basic Metabolic Panel   Result Value Ref Range    Sodium 138 135 - 145 MMOL/L    Potassium 4.3 3.5 - 5.1 MMOL/L    Chloride 102 99 - 110 mMol/L    CO2 25 21 - 32 MMOL/L    Anion Gap 11 4 - 16    BUN 9 6 - 23 MG/DL    Creatinine 0.7 0.6 - 1.1 MG/DL    Est, Glom Filt Rate >60 >60 mL/min/1.73m2    Glucose 112 (H) 70 - 99 MG/DL    Calcium 9.6 8.3 - 10.6 MG/DL      Radiographs (if obtained):  [] The following radiograph was interpreted by myself in the absence of a radiologist:   [] Radiologist's Report Reviewed:  No orders to display       EKG (if obtained):   Please See Note of attending physician for EKG interpretation. Chart review shows recent radiograph(s):  No results found. MDM:     Problems Addressed:  1. Right acute serous otitis media, recurrence not specified        Interventions given this visit:   Orders Placed This Encounter   Medications    traMADol (ULTRAM) tablet 50 mg    ibuprofen (ADVIL;MOTRIN) tablet 600 mg    tetracaine (TETRAVISC) 0.5 % ophthalmic solution 2 drop    ciprofloxacin (CILOXAN) 0.3 % ophthalmic solution 1 drop    traMADol (ULTRAM) 50 MG tablet     Sig: Take 1 tablet by mouth every 6 hours as needed for Pain for up to 3 days. Intended supply: 3 days. Take lowest dose possible to manage pain Max Daily Amount: 200 mg     Dispense:  12 tablet     Refill:  0    ibuprofen (IBU) 600 MG tablet     Sig: Take 1 tablet by mouth every 6 hours as needed for Pain     Dispense:  20 tablet     Refill:  0      Interventions listed are used to treat Problem list above    Images independently interpreted by me:    Presents today to the ED. With otitis media. No visible rupture of the membrane on examination. However patient does have discharge and there is no appreciable discharge in the ear canal.  So we will put patient on topical antibiotics. Pain is well controlled. Blood work is negative here. Patient had a vasovagal episode earlier today bradycardic and a little hypotensive. This was very short-lived. Over last a couple of minutes by the time I got to the room her blood pressure was back to normal.  She feels normal neurologically intact physical exam is unremarkable EKG screening shows no acute abnormality she has no chest pain no palpitations. No lightheadedness or dizziness at this time. No headache. I independently managed patient today in the ED.       BP (!) 129/91   Pulse 99   Temp 98.3 °F (36.8 °C) (Oral)   Resp 16   Wt 230 lb (104.3 kg)   SpO2 98%   BMI 40.74 kg/m²       Clinical Impression:  1. Right acute serous otitis media, recurrence not specified        Disposition referral (if applicable):  DO Kyara Mclean [address] [id number] [phone]    In 2 days    Disposition medications (if applicable):  New Prescriptions    IBUPROFEN (IBU) 600 MG TABLET    Take 1 tablet by mouth every 6 hours as needed for Pain    TRAMADOL (ULTRAM) 50 MG TABLET    Take 1 tablet by mouth every 6 hours as needed for Pain for up to 3 days. Intended supply: 3 days.  Take lowest dose possible to manage pain Max Daily Amount: 200 mg         Comment: Please note this report has been produced using speech recognition software and may contain errors related to that system including errors in grammar, punctuation, and spelling, as well as words and phrases that may be inappropriate. If there are any questions or concerns please feel free to contact the dictating provider for clarification. Please note Images are personally interpreted by this Provider ([place]. )However final disposition is made with deference to Radiologist interpretation of said images.        Rosio Mayfield, [id number] [facility]  02/17/23 0759

## 2023-03-26 ENCOUNTER — APPOINTMENT (OUTPATIENT)
Dept: CT IMAGING | Age: 23
End: 2023-03-26

## 2023-03-26 ENCOUNTER — APPOINTMENT (OUTPATIENT)
Dept: ULTRASOUND IMAGING | Age: 23
End: 2023-03-26

## 2023-03-26 ENCOUNTER — HOSPITAL ENCOUNTER (EMERGENCY)
Age: 23
Discharge: HOME | End: 2023-03-26
Attending: EMERGENCY MEDICINE

## 2023-03-26 VITALS
OXYGEN SATURATION: 97 % | DIASTOLIC BLOOD PRESSURE: 90 MMHG | WEIGHT: 293 LBS | BODY MASS INDEX: 51.91 KG/M2 | TEMPERATURE: 97.7 F | HEIGHT: 63 IN | SYSTOLIC BLOOD PRESSURE: 142 MMHG | HEART RATE: 82 BPM | RESPIRATION RATE: 16 BRPM

## 2023-03-26 DIAGNOSIS — R10.2 PELVIC PAIN: Primary | ICD-10-CM

## 2023-03-26 LAB
ALBUMIN SERPL-MCNC: 4.2 GM/DL (ref 3.4–5)
ALP BLD-CCNC: 71 IU/L (ref 40–129)
ALT SERPL-CCNC: 15 U/L (ref 10–40)
ANION GAP SERPL CALCULATED.3IONS-SCNC: 11 MMOL/L (ref 4–16)
AST SERPL-CCNC: 18 IU/L (ref 15–37)
BACTERIA: ABNORMAL /HPF
BASOPHILS ABSOLUTE: 0 K/CU MM
BASOPHILS RELATIVE PERCENT: 0.3 % (ref 0–1)
BILIRUB SERPL-MCNC: 0.2 MG/DL (ref 0–1)
BILIRUBIN URINE: NEGATIVE MG/DL
BLOOD, URINE: ABNORMAL
BUN SERPL-MCNC: 11 MG/DL (ref 6–23)
CALCIUM SERPL-MCNC: 9.5 MG/DL (ref 8.3–10.6)
CAST TYPE: ABNORMAL /HPF
CHLORIDE BLD-SCNC: 106 MMOL/L (ref 99–110)
CLARITY: CLEAR
CO2: 24 MMOL/L (ref 21–32)
COLOR: YELLOW
CREAT SERPL-MCNC: 0.7 MG/DL (ref 0.6–1.1)
CRYSTAL TYPE: NEGATIVE /HPF
DIFFERENTIAL TYPE: ABNORMAL
EOSINOPHILS ABSOLUTE: 0.1 K/CU MM
EOSINOPHILS RELATIVE PERCENT: 0.9 % (ref 0–3)
EPITHELIAL CELLS, UA: 3 /HPF
GFR SERPL CREATININE-BSD FRML MDRD: >60 ML/MIN/1.73M2
GLUCOSE SERPL-MCNC: 92 MG/DL (ref 70–99)
GLUCOSE, URINE: NEGATIVE MG/DL
HCG QUALITATIVE: NEGATIVE
HCT VFR BLD CALC: 37.5 % (ref 37–47)
HEMOGLOBIN: 12.3 GM/DL (ref 12.5–16)
IMMATURE NEUTROPHIL %: 0.4 % (ref 0–0.43)
KETONES, URINE: NEGATIVE MG/DL
LEUKOCYTE ESTERASE, URINE: NEGATIVE
LYMPHOCYTES ABSOLUTE: 1.5 K/CU MM
LYMPHOCYTES RELATIVE PERCENT: 13.5 % (ref 24–44)
MCH RBC QN AUTO: 26.3 PG (ref 27–31)
MCHC RBC AUTO-ENTMCNC: 32.8 % (ref 32–36)
MCV RBC AUTO: 80.1 FL (ref 78–100)
MONOCYTES ABSOLUTE: 0.5 K/CU MM
MONOCYTES RELATIVE PERCENT: 4.2 % (ref 0–4)
NITRITE URINE, QUANTITATIVE: NEGATIVE
PDW BLD-RTO: 14.2 % (ref 11.7–14.9)
PH, URINE: 6.5 (ref 5–8)
PLATELET # BLD: 288 K/CU MM (ref 140–440)
PMV BLD AUTO: 9.4 FL (ref 7.5–11.1)
POTASSIUM SERPL-SCNC: 4.6 MMOL/L (ref 3.5–5.1)
PROTEIN UA: NEGATIVE MG/DL
RBC # BLD: 4.68 M/CU MM (ref 4.2–5.4)
RBC URINE: 3 /HPF (ref 0–6)
SEGMENTED NEUTROPHILS ABSOLUTE COUNT: 8.9 K/CU MM
SEGMENTED NEUTROPHILS RELATIVE PERCENT: 80.7 % (ref 36–66)
SODIUM BLD-SCNC: 141 MMOL/L (ref 135–145)
SPECIFIC GRAVITY UA: 1.01 (ref 1–1.03)
TOTAL IMMATURE NEUTOROPHIL: 0.04 K/CU MM
TOTAL PROTEIN: 7.1 GM/DL (ref 6.4–8.2)
UROBILINOGEN, URINE: 0.2 MG/DL (ref 0.2–1)
WBC # BLD: 11 K/CU MM (ref 4–10.5)
WBC UA: 1 /HPF (ref 0–5)

## 2023-03-26 PROCEDURE — 93975 VASCULAR STUDY: CPT

## 2023-03-26 PROCEDURE — 99284 EMERGENCY DEPT VISIT MOD MDM: CPT

## 2023-03-26 PROCEDURE — 76830 TRANSVAGINAL US NON-OB: CPT

## 2023-03-26 PROCEDURE — 6360000002 HC RX W HCPCS: Performed by: EMERGENCY MEDICINE

## 2023-03-26 PROCEDURE — 81001 URINALYSIS AUTO W/SCOPE: CPT

## 2023-03-26 PROCEDURE — 96374 THER/PROPH/DIAG INJ IV PUSH: CPT

## 2023-03-26 PROCEDURE — 80053 COMPREHEN METABOLIC PANEL: CPT

## 2023-03-26 PROCEDURE — 2580000003 HC RX 258: Performed by: EMERGENCY MEDICINE

## 2023-03-26 PROCEDURE — 74176 CT ABD & PELVIS W/O CONTRAST: CPT

## 2023-03-26 PROCEDURE — 85025 COMPLETE CBC W/AUTO DIFF WBC: CPT

## 2023-03-26 PROCEDURE — 96375 TX/PRO/DX INJ NEW DRUG ADDON: CPT

## 2023-03-26 PROCEDURE — 84703 CHORIONIC GONADOTROPIN ASSAY: CPT

## 2023-03-26 RX ORDER — IBUPROFEN 800 MG/1
800 TABLET ORAL EVERY 8 HOURS PRN
Qty: 24 TABLET | Refills: 0 | Status: SHIPPED | OUTPATIENT
Start: 2023-03-26

## 2023-03-26 RX ORDER — MORPHINE SULFATE 4 MG/ML
4 INJECTION, SOLUTION INTRAMUSCULAR; INTRAVENOUS ONCE
Status: COMPLETED | OUTPATIENT
Start: 2023-03-26 | End: 2023-03-26

## 2023-03-26 RX ORDER — KETOROLAC TROMETHAMINE 30 MG/ML
15 INJECTION, SOLUTION INTRAMUSCULAR; INTRAVENOUS ONCE
Status: COMPLETED | OUTPATIENT
Start: 2023-03-26 | End: 2023-03-26

## 2023-03-26 RX ORDER — 0.9 % SODIUM CHLORIDE 0.9 %
1000 INTRAVENOUS SOLUTION INTRAVENOUS ONCE
Status: COMPLETED | OUTPATIENT
Start: 2023-03-26 | End: 2023-03-26

## 2023-03-26 RX ORDER — ONDANSETRON 2 MG/ML
4 INJECTION INTRAMUSCULAR; INTRAVENOUS ONCE
Status: COMPLETED | OUTPATIENT
Start: 2023-03-26 | End: 2023-03-26

## 2023-03-26 RX ORDER — TRAMADOL HYDROCHLORIDE 50 MG/1
50 TABLET ORAL EVERY 6 HOURS PRN
Qty: 12 TABLET | Refills: 0 | Status: SHIPPED | OUTPATIENT
Start: 2023-03-26 | End: 2023-03-29

## 2023-03-26 RX ADMIN — KETOROLAC TROMETHAMINE 15 MG: 30 INJECTION, SOLUTION INTRAMUSCULAR; INTRAVENOUS at 13:14

## 2023-03-26 RX ADMIN — MORPHINE SULFATE 4 MG: 4 INJECTION, SOLUTION INTRAMUSCULAR; INTRAVENOUS at 13:15

## 2023-03-26 RX ADMIN — ONDANSETRON 4 MG: 2 INJECTION INTRAMUSCULAR; INTRAVENOUS at 13:13

## 2023-03-26 RX ADMIN — SODIUM CHLORIDE 1000 ML: 9 INJECTION, SOLUTION INTRAVENOUS at 13:13

## 2023-03-26 ASSESSMENT — PAIN DESCRIPTION - LOCATION
LOCATION: ABDOMEN
LOCATION: ABDOMEN

## 2023-03-26 ASSESSMENT — PAIN DESCRIPTION - FREQUENCY: FREQUENCY: CONTINUOUS

## 2023-03-26 ASSESSMENT — PAIN SCALES - GENERAL
PAINLEVEL_OUTOF10: 10
PAINLEVEL_OUTOF10: 10

## 2023-03-26 ASSESSMENT — PAIN DESCRIPTION - DESCRIPTORS: DESCRIPTORS: CRAMPING

## 2023-03-26 ASSESSMENT — PAIN - FUNCTIONAL ASSESSMENT: PAIN_FUNCTIONAL_ASSESSMENT: 0-10

## 2023-03-26 ASSESSMENT — PAIN DESCRIPTION - ORIENTATION: ORIENTATION: LOWER

## 2023-03-26 NOTE — ED PROVIDER NOTES
EXAMINATION:   PELVIC ULTRASOUND; DOPPLER EVALUATION OF THE PELVIS     3/26/2023     TECHNIQUE:   Transvaginal pelvic ultrasound duplex ultrasound using B-mode/gray scaled   imaging, Doppler spectral analysis and color flow Doppler was obtained. COMPARISON:   None     HISTORY:   ORDERING SYSTEM PROVIDED HISTORY: pelvic pain, evaluate for torsion   TECHNOLOGIST PROVIDED HISTORY:     Reason for exam:->pelvic pain, evaluate for torsion   Reason for Exam: pain     FINDINGS:     Measurements:     Uterus: 8.2 x 4.5 x 3.5 cm     Endometrial stripe: 5.8 mm     Right Ovary:3 x 1.9 x 2.4 cm     Left Ovary: 2.5 x 1.5 x 1.4 cm       Ultrasound Findings:     Uterus: Uterus demonstrates normal myometrial echotexture. Endometrial stripe: Endometrial stripe is within normal limits. Right Ovary: Right ovary is within normal limits. There is normal arterial   and venous Doppler flow. Left Ovary:  Left ovary is within normal limits. There is normal arterial and   venous Doppler flow. Free Fluid: No evidence of free fluid. CT ABDOMEN PELVIS WO CONTRAST Additional Contrast? None (Final result)  Result time 03/26/23 15:02:16  Final result by Nsa Mensah MD (03/26/23 15:02:16)                Narrative:    EXAMINATION:   CT OF THE ABDOMEN AND PELVIS WITHOUT CONTRAST 3/26/2023 2:27 pm     TECHNIQUE:   CT of the abdomen and pelvis was performed without the administration of   intravenous contrast. Multiplanar reformatted images are provided for review. Automated exposure control, iterative reconstruction, and/or weight based   adjustment of the mA/kV was utilized to reduce the radiation dose to as low   as reasonably achievable. COMPARISON:   None.      HISTORY:   ORDERING SYSTEM PROVIDED HISTORY: Lower abdominal pain   TECHNOLOGIST PROVIDED HISTORY:   Reason for exam:->Lower abdominal pain   Additional Contrast?->None   Decision Support Exception - unselect if not a suspected or confirmed

## 2023-03-26 NOTE — ED NOTES
Pt brought to room 3  Per EMS for lower abd cramping started 40 Min PTA  Pt states she did have an episode of vomiting and diarrhea when the pain started   Pt has not taken anything for the pain     Shen Snow RN  03/26/23 0934